# Patient Record
Sex: MALE | Race: WHITE
[De-identification: names, ages, dates, MRNs, and addresses within clinical notes are randomized per-mention and may not be internally consistent; named-entity substitution may affect disease eponyms.]

---

## 2017-08-18 ENCOUNTER — HOSPITAL ENCOUNTER (EMERGENCY)
Dept: HOSPITAL 53 - M ED | Age: 82
Discharge: HOME | End: 2017-08-18
Payer: MEDICARE

## 2017-08-18 VITALS — SYSTOLIC BLOOD PRESSURE: 153 MMHG | DIASTOLIC BLOOD PRESSURE: 80 MMHG

## 2017-08-18 VITALS — BODY MASS INDEX: 27.15 KG/M2 | HEIGHT: 72 IN | WEIGHT: 200.42 LBS
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
## 2017-08-18 DIAGNOSIS — S01.111A: Primary | ICD-10-CM

## 2017-08-18 DIAGNOSIS — S50.811A: ICD-10-CM

## 2017-08-18 DIAGNOSIS — S05.12XA: ICD-10-CM
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
## 2021-03-27 ENCOUNTER — HOSPITAL ENCOUNTER (OUTPATIENT)
Dept: HOSPITAL 53 - M LABSMTC | Age: 86
End: 2021-03-27
Attending: ANESTHESIOLOGY
Payer: MEDICARE

## 2021-03-27 DIAGNOSIS — Z01.812: Primary | ICD-10-CM

## 2021-04-01 ENCOUNTER — HOSPITAL ENCOUNTER (OUTPATIENT)
Dept: HOSPITAL 53 - M SDC | Age: 86
Discharge: HOME | End: 2021-04-01
Attending: OPHTHALMOLOGY
Payer: MEDICARE

## 2021-04-01 VITALS — BODY MASS INDEX: 28.98 KG/M2 | WEIGHT: 207 LBS | HEIGHT: 71 IN

## 2021-04-01 VITALS — SYSTOLIC BLOOD PRESSURE: 144 MMHG | DIASTOLIC BLOOD PRESSURE: 72 MMHG

## 2021-04-01 DIAGNOSIS — Z85.46: ICD-10-CM

## 2021-04-01 DIAGNOSIS — Z79.899: ICD-10-CM

## 2021-04-01 DIAGNOSIS — H25.12: Primary | ICD-10-CM

## 2021-04-01 PROCEDURE — 66984 XCAPSL CTRC RMVL W/O ECP: CPT

## 2021-04-02 NOTE — RO
OPERATIVE NOTE



DATE OF OPERATION: 04/01/2021



PREOPERATIVE DIAGNOSIS: 

1. Visually significant nuclear sclerotic cataract, left eye.



POSTOPERATIVE DIAGNOSIS:

1. Visually significant nuclear sclerotic cataract, left eye.



PROCEDURE:

1. Cataract extraction with use of phacoemulsification, and placement of

intraocular lens, AU00T0, 23.0 D, left eye.



SURGEON:  Crow Stern DO



ASSISTANT:



ANESTHESIA:  Local (Omidria) with MAC. 

COMPLICATIONS:  None

POSTOPERATIVE CONDITION:  Stable

INDICATIONS FOR SURGERY:  

1.  Blurred vision affecting patient's activities of daily living.



DESCRIPTION OF PROCEDURE:

The patient was seen in the preoperative area and properly identified. The

correct operative eye was identified and marked. The patient received topical

anesthetic, antibiotics, and topical dilating drops. The patient was then

transferred to the operating room.



The correct side was re-identified and a time out was performed. The eye was

prepped and draped in a sterile fashion. The eyelids were isolated with

Tegaderm tape and the lids were held open with an adjustable speculum.

A 1.0 mm paracentesis incision was made. Omidria was then injected into the

anterior chamber. Viscoelastic was then injected into the anterior chamber

through the paracentesis. Using a 2.4 mm sharp-tipped keratome, the anterior

chamber was entered via a temporal clear cornea incision.

A continuous curvilinear capsulorrhexis was created with Utrata forceps.

Hydrodissection was performed with BSS on a blunt cannula until the nucleus was

able to rotate freely. The crystalline lens was phacoemulsified and aspirated.

Irrigation/aspiration was used to remove the cortical material

Cohesive viscoelastic was placed into the capsular bag to deepen it. The

implant was placed into the capsular bag and allowed to unfold. Placement was

confirmed by visualizing the anterior capsulorrhexis. Irrigation/aspiration was

used to remove the viscoelastic.

The clear corneal incision was hydrated with BSS on a blunt cannula. The lens

was well positioned. Intracameral antibiotic was injected into the anterior

chamber. The incisions were then tested for leaks and found to be negative. The

eye was then palpated for appropriate pressure and adjusted accordingly with

BSS.

The eyelid speculum was then carefully removed. A shield was placed over the

eye.

The patient tolerated the procedure well and was discharge to the recovery unit

in a stable condition.

LORY

## 2021-04-03 ENCOUNTER — HOSPITAL ENCOUNTER (OUTPATIENT)
Dept: HOSPITAL 53 - M LABSMTC | Age: 86
End: 2021-04-03
Attending: ANESTHESIOLOGY
Payer: MEDICARE

## 2021-04-03 DIAGNOSIS — Z20.828: Primary | ICD-10-CM

## 2021-04-03 DIAGNOSIS — Z11.59: ICD-10-CM

## 2021-04-08 ENCOUNTER — HOSPITAL ENCOUNTER (OUTPATIENT)
Dept: HOSPITAL 53 - M SDC | Age: 86
Discharge: HOME | End: 2021-04-08
Attending: OPHTHALMOLOGY
Payer: MEDICARE

## 2021-04-08 VITALS — BODY MASS INDEX: 27.09 KG/M2 | WEIGHT: 200 LBS | HEIGHT: 72 IN

## 2021-04-08 VITALS — SYSTOLIC BLOOD PRESSURE: 165 MMHG | DIASTOLIC BLOOD PRESSURE: 75 MMHG

## 2021-04-08 DIAGNOSIS — Z85.46: ICD-10-CM

## 2021-04-08 DIAGNOSIS — N40.0: ICD-10-CM

## 2021-04-08 DIAGNOSIS — Z79.899: ICD-10-CM

## 2021-04-08 DIAGNOSIS — H25.11: Primary | ICD-10-CM

## 2021-04-08 PROCEDURE — 66984 XCAPSL CTRC RMVL W/O ECP: CPT

## 2021-04-09 NOTE — RO
OPERATIVE NOTE



DATE OF OPERATION: 04/08/2021



PREOPERATIVE DIAGNOSIS: 

1. Visually significant nuclear sclerotic cataract, right eye.



POSTOPERATIVE DIAGNOSIS:

1. Visually significant nuclear sclerotic cataract, right eye.



PROCEDURE:

1. Cataract extraction with use of phacoemulsification, and placement of

intraocular lens, AU00T0, 24.5 D, right eye.



SURGEON:  Crow Stern DO



ASSISTANT:



ANESTHESIA:  Local (Omidria) with MAC. 

COMPLICATIONS:  None

POSTOPERATIVE CONDITION:  Stable

INDICATIONS FOR SURGERY:  

1.  Blurred vision affecting patient's activities of daily living.



DESCRIPTION OF PROCEDURE:

The patient was seen in the preoperative area and properly identified. The

correct operative eye was identified and marked. The patient received topical

anesthetic, antibiotics, and topical dilating drops. The patient was then

transferred to the operating room.



The correct side was re-identified and a time out was performed. The eye was

prepped and draped in a sterile fashion. The eyelids were isolated with

Tegaderm tape and the lids were held open with an adjustable speculum.

A 1.0 mm paracentesis incision was made. Omidria was then injected into the

anterior chamber. Viscoelastic was then injected into the anterior chamber

through the paracentesis. Using a 2.4 mm sharp-tipped keratome, the anterior

chamber was entered via a temporal clear cornea incision.

A continuous curvilinear capsulorrhexis was created with Utrata forceps.

Hydrodissection was performed with BSS on a blunt cannula until the nucleus was

able to rotate freely. The crystalline lens was phacoemulsified and aspirated.

Irrigation/aspiration was used to remove the cortical material

Cohesive viscoelastic was placed into the capsular bag to deepen it. The

implant was placed into the capsular bag and allowed to unfold. Placement was

confirmed by visualizing the anterior capsulorrhexis. Irrigation/aspiration was

used to remove the viscoelastic.

The clear corneal incision was hydrated with BSS on a blunt cannula. The lens

was well positioned. Intracameral antibiotic was injected into the anterior

chamber. The incisions were then tested for leaks and found to be negative. The

eye was then palpated for appropriate pressure and adjusted accordingly with

BSS.

The eyelid speculum was then carefully removed. A shield was placed over the

eye.

The patient tolerated the procedure well and was discharge to the recovery unit

in a stable condition.

## 2022-04-29 ENCOUNTER — HOSPITAL ENCOUNTER (OUTPATIENT)
Dept: HOSPITAL 53 - M ADAMS | Age: 87
End: 2022-04-29
Attending: FAMILY MEDICINE
Payer: MEDICARE

## 2022-04-29 DIAGNOSIS — E83.119: ICD-10-CM

## 2022-04-29 DIAGNOSIS — E78.6: Primary | ICD-10-CM

## 2022-04-29 DIAGNOSIS — Z79.899: ICD-10-CM

## 2022-04-29 DIAGNOSIS — R01.1: ICD-10-CM

## 2022-04-29 LAB
25(OH)D3 SERPL-MCNC: 27.8 NG/ML (ref 30–100)
ALBUMIN SERPL BCG-MCNC: 3.5 GM/DL (ref 3.2–5.2)
ALT SERPL W P-5'-P-CCNC: 21 U/L (ref 12–78)
BASOPHILS # BLD AUTO: 0 10^3/UL (ref 0–0.2)
BASOPHILS NFR BLD AUTO: 0.8 % (ref 0–1)
BILIRUB SERPL-MCNC: 1.2 MG/DL (ref 0.2–1)
BUN SERPL-MCNC: 11 MG/DL (ref 7–18)
CALCIUM SERPL-MCNC: 8.5 MG/DL (ref 8.8–10.2)
CHLORIDE SERPL-SCNC: 108 MEQ/L (ref 98–107)
CHOLEST SERPL-MCNC: 85 MG/DL (ref ?–200)
CHOLEST/HDLC SERPL: 1.73 {RATIO} (ref ?–5)
CO2 SERPL-SCNC: 29 MEQ/L (ref 21–32)
CREAT SERPL-MCNC: 0.85 MG/DL (ref 0.7–1.3)
EOSINOPHIL # BLD AUTO: 0.3 10^3/UL (ref 0–0.5)
EOSINOPHIL NFR BLD AUTO: 6.3 % (ref 0–3)
FERRITIN SERPL-MCNC: 37 NG/ML (ref 26–388)
FOLATE SERPL-MCNC: 10.5 NG/ML
GFR SERPL CREATININE-BSD FRML MDRD: > 60 ML/MIN/{1.73_M2} (ref 35–?)
GLUCOSE SERPL-MCNC: 90 MG/DL (ref 70–100)
HCT VFR BLD AUTO: 31.3 % (ref 42–52)
HDLC SERPL-MCNC: 49 MG/DL (ref 40–?)
HGB BLD-MCNC: 10 G/DL (ref 13.5–17.5)
LDLC SERPL CALC-MCNC: 25 MG/DL (ref ?–100)
LYMPHOCYTES # BLD AUTO: 1.6 10^3/UL (ref 1.5–5)
LYMPHOCYTES NFR BLD AUTO: 31 % (ref 24–44)
MCH RBC QN AUTO: 31.3 PG (ref 27–33)
MCHC RBC AUTO-ENTMCNC: 31.9 G/DL (ref 32–36.5)
MCV RBC AUTO: 97.8 FL (ref 80–96)
MONOCYTES # BLD AUTO: 0.6 10^3/UL (ref 0–0.8)
MONOCYTES NFR BLD AUTO: 12.2 % (ref 2–8)
NEUTROPHILS # BLD AUTO: 2.5 10^3/UL (ref 1.5–8.5)
NEUTROPHILS NFR BLD AUTO: 49.5 % (ref 36–66)
NONHDLC SERPL-MCNC: 36 MG/DL
PLATELET # BLD AUTO: 185 10^3/UL (ref 150–450)
POTASSIUM SERPL-SCNC: 4.3 MEQ/L (ref 3.5–5.1)
PROT SERPL-MCNC: 6.7 GM/DL (ref 6.4–8.2)
RBC # BLD AUTO: 3.2 10^6/UL (ref 4.3–6.1)
SODIUM SERPL-SCNC: 141 MEQ/L (ref 136–145)
TRIGL SERPL-MCNC: 55 MG/DL (ref ?–150)
VIT B12 SERPL-MCNC: 721 PG/ML
WBC # BLD AUTO: 5.1 10^3/UL (ref 4–10)

## 2023-04-25 ENCOUNTER — HOSPITAL ENCOUNTER (OUTPATIENT)
Dept: HOSPITAL 53 - M LABDRWAD | Age: 88
End: 2023-04-25
Attending: FAMILY MEDICINE
Payer: MEDICARE

## 2023-04-25 DIAGNOSIS — Z79.899: ICD-10-CM

## 2023-04-25 DIAGNOSIS — E83.110: ICD-10-CM

## 2023-04-25 DIAGNOSIS — R73.01: Primary | ICD-10-CM

## 2023-04-25 DIAGNOSIS — E78.2: ICD-10-CM

## 2023-04-25 DIAGNOSIS — D51.9: ICD-10-CM

## 2023-04-25 DIAGNOSIS — E78.6: ICD-10-CM

## 2023-04-25 LAB
25(OH)D3 SERPL-MCNC: 22.5 NG/ML (ref 20–100)
ALBUMIN SERPL BCG-MCNC: 3.4 G/DL (ref 3.2–5.2)
ALP SERPL-CCNC: 71 U/L (ref 46–116)
ALT SERPL W P-5'-P-CCNC: 13 U/L (ref 7–40)
AST SERPL-CCNC: 23 U/L (ref ?–34)
BASOPHILS # BLD AUTO: 0.1 10^3/UL (ref 0–0.2)
BASOPHILS NFR BLD AUTO: 1.1 % (ref 0–1)
BILIRUB SERPL-MCNC: 1.5 MG/DL (ref 0.3–1.2)
BUN SERPL-MCNC: 13 MG/DL (ref 9–23)
CALCIUM SERPL-MCNC: 8.5 MG/DL (ref 8.3–10.6)
CHLORIDE SERPL-SCNC: 108 MMOL/L (ref 98–107)
CHOLEST SERPL-MCNC: 119 MG/DL (ref ?–200)
CHOLEST/HDLC SERPL: 2.67 {RATIO} (ref ?–5)
CO2 SERPL-SCNC: 24 MMOL/L (ref 20–31)
CREAT SERPL-MCNC: 0.82 MG/DL (ref 0.7–1.3)
EOSINOPHIL # BLD AUTO: 0.2 10^3/UL (ref 0–0.5)
EOSINOPHIL NFR BLD AUTO: 4.1 % (ref 0–3)
FERRITIN SERPL-MCNC: 20.9 NG/ML (ref 10.5–307.3)
FOLATE SERPL-MCNC: 9.4 NG/ML (ref 5.4–?)
GFR SERPL CREATININE-BSD FRML MDRD: > 60 ML/MIN/{1.73_M2} (ref 35–?)
GLUCOSE SERPL-MCNC: 89 MG/DL (ref 74–106)
HCT VFR BLD AUTO: 27.2 % (ref 42–52)
HDLC SERPL-MCNC: 44.5 MG/DL (ref 40–?)
HGB BLD-MCNC: 8.5 G/DL (ref 13.5–17.5)
LDLC SERPL CALC-MCNC: 61.7 MG/DL (ref ?–100)
LYMPHOCYTES # BLD AUTO: 1.1 10^3/UL (ref 1.5–5)
LYMPHOCYTES NFR BLD AUTO: 24.1 % (ref 24–44)
MCH RBC QN AUTO: 28.6 PG (ref 27–33)
MCHC RBC AUTO-ENTMCNC: 31.3 G/DL (ref 32–36.5)
MCV RBC AUTO: 91.6 FL (ref 80–96)
MONOCYTES # BLD AUTO: 0.6 10^3/UL (ref 0–0.8)
MONOCYTES NFR BLD AUTO: 14.2 % (ref 2–8)
NEUTROPHILS # BLD AUTO: 2.5 10^3/UL (ref 1.5–8.5)
NEUTROPHILS NFR BLD AUTO: 56.3 % (ref 36–66)
NONHDLC SERPL-MCNC: 74.5 MG/DL
PLATELET # BLD AUTO: 228 10^3/UL (ref 150–450)
POTASSIUM SERPL-SCNC: 4.4 MMOL/L (ref 3.5–5.1)
PROT SERPL-MCNC: 6.5 G/DL (ref 5.7–8.2)
RBC # BLD AUTO: 2.97 10^6/UL (ref 4.3–6.1)
SODIUM SERPL-SCNC: 139 MMOL/L (ref 136–145)
TRIGL SERPL-MCNC: 64 MG/DL (ref ?–150)
VIT B12 SERPL-MCNC: 634 PG/ML (ref 211–911)
WBC # BLD AUTO: 4.4 10^3/UL (ref 4–10)

## 2023-06-14 ENCOUNTER — HOSPITAL ENCOUNTER (EMERGENCY)
Dept: HOSPITAL 53 - M ED | Age: 88
Discharge: HOME | End: 2023-06-14
Payer: MEDICARE

## 2023-06-14 VITALS — TEMPERATURE: 98.8 F | BODY MASS INDEX: 26.04 KG/M2 | HEIGHT: 72 IN | WEIGHT: 192.24 LBS

## 2023-06-14 VITALS — OXYGEN SATURATION: 97 % | DIASTOLIC BLOOD PRESSURE: 65 MMHG | SYSTOLIC BLOOD PRESSURE: 154 MMHG

## 2023-06-14 VITALS — OXYGEN SATURATION: 96 %

## 2023-06-14 DIAGNOSIS — D51.9: ICD-10-CM

## 2023-06-14 DIAGNOSIS — Z85.820: ICD-10-CM

## 2023-06-14 DIAGNOSIS — Z79.82: ICD-10-CM

## 2023-06-14 DIAGNOSIS — Z85.46: ICD-10-CM

## 2023-06-14 DIAGNOSIS — Z79.899: ICD-10-CM

## 2023-06-14 DIAGNOSIS — E78.5: ICD-10-CM

## 2023-06-14 DIAGNOSIS — E83.110: ICD-10-CM

## 2023-06-14 DIAGNOSIS — D50.9: Primary | ICD-10-CM

## 2023-06-14 LAB
ALBUMIN SERPL BCG-MCNC: 3.4 G/DL (ref 3.2–5.2)
ALP SERPL-CCNC: 85 U/L (ref 46–116)
ALT SERPL W P-5'-P-CCNC: 19 U/L (ref 7–40)
AST SERPL-CCNC: 22 U/L (ref ?–34)
BASE EXCESS BLDV CALC-SCNC: -1.6 MMOL/L (ref -2–2)
BASOPHILS # BLD AUTO: 0.1 10^3/UL (ref 0–0.2)
BASOPHILS NFR BLD AUTO: 0.9 % (ref 0–1)
BILIRUB CONJ SERPL-MCNC: 0.5 MG/DL (ref ?–0.4)
BILIRUB SERPL-MCNC: 1.3 MG/DL (ref 0.3–1.2)
BUN SERPL-MCNC: 10 MG/DL (ref 9–23)
CALCIUM SERPL-MCNC: 8.5 MG/DL (ref 8.3–10.6)
CHLORIDE SERPL-SCNC: 104 MMOL/L (ref 98–107)
CO2 BLDV CALC-SCNC: 22.7 MMOL/L (ref 24–28)
CO2 SERPL-SCNC: 24 MMOL/L (ref 20–31)
CREAT SERPL-MCNC: 0.99 MG/DL (ref 0.7–1.3)
EOSINOPHIL # BLD AUTO: 0.1 10^3/UL (ref 0–0.5)
EOSINOPHIL NFR BLD AUTO: 2 % (ref 0–3)
FERRITIN SERPL-MCNC: 16.7 NG/ML (ref 10.5–307.3)
FOLATE SERPL-MCNC: 9.4 NG/ML (ref 5.4–?)
GFR SERPL CREATININE-BSD FRML MDRD: > 60 ML/MIN/{1.73_M2} (ref 35–?)
GLUCOSE SERPL-MCNC: 107 MG/DL (ref 74–106)
HCO3 BLDV-SCNC: 21.8 MMOL/L (ref 23–27)
HCO3 STD BLDV-SCNC: 23.1 MMOL/L
HCT VFR BLD AUTO: 26.8 % (ref 42–52)
HGB BLD-MCNC: 8.2 G/DL (ref 13.5–17.5)
IRON SATN MFR SERPL: 11.2 % (ref 19.7–50)
IRON SERPL-MCNC: 36 UG/DL (ref 65–175)
LYMPHOCYTES # BLD AUTO: 0.9 10^3/UL (ref 1.5–5)
LYMPHOCYTES NFR BLD AUTO: 16.2 % (ref 24–44)
MAGNESIUM SERPL-MCNC: 1.8 MG/DL (ref 1.8–2.4)
MCH RBC QN AUTO: 27.4 PG (ref 27–33)
MCHC RBC AUTO-ENTMCNC: 30.6 G/DL (ref 32–36.5)
MCV RBC AUTO: 89.6 FL (ref 80–96)
MONOCYTES # BLD AUTO: 0.9 10^3/UL (ref 0–0.8)
MONOCYTES NFR BLD AUTO: 15.6 % (ref 2–8)
NEUTROPHILS # BLD AUTO: 3.6 10^3/UL (ref 1.5–8.5)
NEUTROPHILS NFR BLD AUTO: 65.1 % (ref 36–66)
PCO2 BLDV: 31.4 MMHG (ref 38–50)
PH BLDV: 7.46 UNITS (ref 7.33–7.43)
PLATELET # BLD AUTO: 203 10^3/UL (ref 150–450)
PO2 BLDV: 100.2 MMHG (ref 30–50)
POTASSIUM SERPL-SCNC: 3.2 MMOL/L (ref 3.5–5.1)
PROT SERPL-MCNC: 6.4 G/DL (ref 5.7–8.2)
RBC # BLD AUTO: 2.99 10^6/UL (ref 4.3–6.1)
SAO2 % BLDV: 97.2 % (ref 60–80)
SODIUM SERPL-SCNC: 137 MMOL/L (ref 136–145)
TIBC SERPL-MCNC: 321 UG/DL (ref 250–425)
TSH SERPL DL<=0.005 MIU/L-ACNC: 2.25 UIU/ML (ref 0.55–4.78)
VIT B12 SERPL-MCNC: 814 PG/ML (ref 211–911)
WBC # BLD AUTO: 5.5 10^3/UL (ref 4–10)

## 2023-06-14 PROCEDURE — 83550 IRON BINDING TEST: CPT

## 2023-06-14 PROCEDURE — 94760 N-INVAS EAR/PLS OXIMETRY 1: CPT

## 2023-06-14 PROCEDURE — 82746 ASSAY OF FOLIC ACID SERUM: CPT

## 2023-06-14 PROCEDURE — 99285 EMERGENCY DEPT VISIT HI MDM: CPT

## 2023-06-14 PROCEDURE — 82607 VITAMIN B-12: CPT

## 2023-06-14 PROCEDURE — 36415 COLL VENOUS BLD VENIPUNCTURE: CPT

## 2023-06-14 PROCEDURE — 93041 RHYTHM ECG TRACING: CPT

## 2023-06-14 PROCEDURE — 80076 HEPATIC FUNCTION PANEL: CPT

## 2023-06-14 PROCEDURE — 85025 COMPLETE CBC W/AUTO DIFF WBC: CPT

## 2023-06-14 PROCEDURE — 71275 CT ANGIOGRAPHY CHEST: CPT

## 2023-06-14 PROCEDURE — 93005 ELECTROCARDIOGRAM TRACING: CPT

## 2023-06-14 PROCEDURE — 87040 BLOOD CULTURE FOR BACTERIA: CPT

## 2023-06-14 PROCEDURE — 84443 ASSAY THYROID STIM HORMONE: CPT

## 2023-06-14 PROCEDURE — 87486 CHLMYD PNEUM DNA AMP PROBE: CPT

## 2023-06-14 PROCEDURE — 82728 ASSAY OF FERRITIN: CPT

## 2023-06-14 PROCEDURE — 87633 RESP VIRUS 12-25 TARGETS: CPT

## 2023-06-14 PROCEDURE — 83880 ASSAY OF NATRIURETIC PEPTIDE: CPT

## 2023-06-14 PROCEDURE — 87581 M.PNEUMON DNA AMP PROBE: CPT

## 2023-06-14 PROCEDURE — 71045 X-RAY EXAM CHEST 1 VIEW: CPT

## 2023-06-14 PROCEDURE — 83735 ASSAY OF MAGNESIUM: CPT

## 2023-06-14 PROCEDURE — 80048 BASIC METABOLIC PNL TOTAL CA: CPT

## 2023-06-14 PROCEDURE — 82803 BLOOD GASES ANY COMBINATION: CPT

## 2023-06-14 PROCEDURE — 87798 DETECT AGENT NOS DNA AMP: CPT

## 2023-08-08 ENCOUNTER — HOSPITAL ENCOUNTER (OUTPATIENT)
Dept: HOSPITAL 53 - M PLALAB | Age: 88
End: 2023-08-08
Payer: MEDICARE

## 2023-08-08 DIAGNOSIS — D50.9: Primary | ICD-10-CM

## 2023-08-08 DIAGNOSIS — Z79.899: ICD-10-CM

## 2023-08-08 DIAGNOSIS — K74.60: ICD-10-CM

## 2023-08-08 LAB
ALBUMIN SERPL BCG-MCNC: 3.3 G/DL (ref 3.2–5.2)
ALP SERPL-CCNC: 117 U/L (ref 46–116)
ALT SERPL W P-5'-P-CCNC: 16 U/L (ref 7–40)
AST SERPL-CCNC: 21 U/L (ref ?–34)
BILIRUB CONJ SERPL-MCNC: 0.4 MG/DL (ref ?–0.4)
BILIRUB SERPL-MCNC: 0.9 MG/DL (ref 0.3–1.2)
FERRITIN SERPL-MCNC: 20.1 NG/ML (ref 10.5–307.3)
FOLATE SERPL-MCNC: 9.34 NG/ML (ref 5.4–?)
HBV CORE IGM SER QL: POSITIVE
HBV SURFACE AB SER QL: NEGATIVE
HCT VFR BLD AUTO: 25.4 % (ref 42–52)
HCV AB SER QL: 0.16 INDEX (ref ?–0.8)
HGB BLD-MCNC: 7.7 G/DL (ref 13.5–17.5)
IRON SATN MFR SERPL: 8.1 % (ref 19.7–50)
IRON SERPL-MCNC: 26 UG/DL (ref 65–175)
MCH RBC QN AUTO: 28.3 PG (ref 27–33)
MCHC RBC AUTO-ENTMCNC: 30.3 G/DL (ref 32–36.5)
MCV RBC AUTO: 93.4 FL (ref 80–96)
PLATELET # BLD AUTO: 237 10^3/UL (ref 150–450)
PROT SERPL-MCNC: 6.1 G/DL (ref 5.7–8.2)
RBC # BLD AUTO: 2.72 10^6/UL (ref 4.3–6.1)
T4 FREE SERPL-MCNC: 0.69 NG/DL (ref 0.89–1.76)
TIBC SERPL-MCNC: 320 UG/DL (ref 250–425)
TSH SERPL DL<=0.005 MIU/L-ACNC: 2.74 UIU/ML (ref 0.55–4.78)
VIT B12 SERPL-MCNC: 638 PG/ML (ref 211–911)
WBC # BLD AUTO: 5.5 10^3/UL (ref 4–10)

## 2023-08-10 LAB — TRANSFERRIN SERPL-MCNC: 244 MG/DL (ref 149–313)

## 2023-08-21 ENCOUNTER — HOSPITAL ENCOUNTER (OUTPATIENT)
Dept: HOSPITAL 53 - M INFU | Age: 88
Discharge: HOME | End: 2023-08-21
Payer: MEDICARE

## 2023-08-21 VITALS — OXYGEN SATURATION: 97 % | SYSTOLIC BLOOD PRESSURE: 131 MMHG | DIASTOLIC BLOOD PRESSURE: 60 MMHG

## 2023-08-21 VITALS — DIASTOLIC BLOOD PRESSURE: 77 MMHG | OXYGEN SATURATION: 98 % | SYSTOLIC BLOOD PRESSURE: 134 MMHG

## 2023-08-21 VITALS — WEIGHT: 195.33 LBS | BODY MASS INDEX: 27.35 KG/M2 | HEIGHT: 71 IN

## 2023-08-21 DIAGNOSIS — D50.9: Primary | ICD-10-CM

## 2023-08-21 PROCEDURE — 96365 THER/PROPH/DIAG IV INF INIT: CPT

## 2023-08-28 ENCOUNTER — HOSPITAL ENCOUNTER (OUTPATIENT)
Dept: HOSPITAL 53 - M INFU | Age: 88
End: 2023-08-28
Payer: MEDICARE

## 2023-08-28 VITALS — DIASTOLIC BLOOD PRESSURE: 64 MMHG | OXYGEN SATURATION: 95 % | SYSTOLIC BLOOD PRESSURE: 119 MMHG

## 2023-08-28 VITALS — SYSTOLIC BLOOD PRESSURE: 125 MMHG | OXYGEN SATURATION: 99 % | DIASTOLIC BLOOD PRESSURE: 60 MMHG

## 2023-08-28 VITALS — BODY MASS INDEX: 27.33 KG/M2 | HEIGHT: 70 IN | WEIGHT: 190.92 LBS

## 2023-08-28 DIAGNOSIS — D50.9: Primary | ICD-10-CM

## 2023-08-28 PROCEDURE — 96365 THER/PROPH/DIAG IV INF INIT: CPT

## 2023-09-05 ENCOUNTER — HOSPITAL ENCOUNTER (OUTPATIENT)
Dept: HOSPITAL 53 - M INFU | Age: 88
Discharge: HOME | End: 2023-09-05
Payer: MEDICARE

## 2023-09-05 VITALS
OXYGEN SATURATION: 95 % | BODY MASS INDEX: 27.33 KG/M2 | WEIGHT: 190.92 LBS | HEIGHT: 70 IN | SYSTOLIC BLOOD PRESSURE: 113 MMHG | DIASTOLIC BLOOD PRESSURE: 60 MMHG

## 2023-09-05 VITALS — DIASTOLIC BLOOD PRESSURE: 58 MMHG | OXYGEN SATURATION: 96 % | SYSTOLIC BLOOD PRESSURE: 104 MMHG

## 2023-09-05 DIAGNOSIS — D50.9: Primary | ICD-10-CM

## 2023-09-05 PROCEDURE — 96365 THER/PROPH/DIAG IV INF INIT: CPT

## 2023-09-11 ENCOUNTER — HOSPITAL ENCOUNTER (OUTPATIENT)
Dept: HOSPITAL 53 - M INFU | Age: 88
End: 2023-09-11
Payer: MEDICARE

## 2023-09-11 VITALS — WEIGHT: 191.8 LBS | BODY MASS INDEX: 25.98 KG/M2 | HEIGHT: 72 IN

## 2023-09-11 VITALS — SYSTOLIC BLOOD PRESSURE: 129 MMHG | DIASTOLIC BLOOD PRESSURE: 60 MMHG | OXYGEN SATURATION: 98 %

## 2023-09-11 VITALS — SYSTOLIC BLOOD PRESSURE: 136 MMHG | DIASTOLIC BLOOD PRESSURE: 67 MMHG | OXYGEN SATURATION: 97 %

## 2023-09-11 DIAGNOSIS — D50.9: Primary | ICD-10-CM

## 2023-09-11 PROCEDURE — 96365 THER/PROPH/DIAG IV INF INIT: CPT

## 2023-12-11 ENCOUNTER — HOSPITAL ENCOUNTER (OUTPATIENT)
Dept: HOSPITAL 53 - M OPP | Age: 88
Discharge: HOME | End: 2023-12-11
Attending: INTERNAL MEDICINE
Payer: MEDICARE

## 2023-12-11 VITALS — OXYGEN SATURATION: 94 % | SYSTOLIC BLOOD PRESSURE: 131 MMHG | DIASTOLIC BLOOD PRESSURE: 70 MMHG

## 2023-12-11 VITALS — WEIGHT: 184.8 LBS | HEIGHT: 72 IN | BODY MASS INDEX: 25.03 KG/M2

## 2023-12-11 DIAGNOSIS — D50.9: ICD-10-CM

## 2023-12-11 DIAGNOSIS — K29.70: ICD-10-CM

## 2023-12-11 DIAGNOSIS — K57.30: ICD-10-CM

## 2023-12-11 DIAGNOSIS — K64.4: ICD-10-CM

## 2023-12-11 DIAGNOSIS — K64.8: ICD-10-CM

## 2023-12-11 DIAGNOSIS — Z79.83: ICD-10-CM

## 2023-12-11 DIAGNOSIS — C18.0: Primary | ICD-10-CM

## 2023-12-11 DIAGNOSIS — Z79.82: ICD-10-CM

## 2023-12-11 DIAGNOSIS — Z95.5: ICD-10-CM

## 2023-12-11 DIAGNOSIS — K22.89: ICD-10-CM

## 2024-01-09 ENCOUNTER — HOSPITAL ENCOUNTER (OUTPATIENT)
Dept: HOSPITAL 53 - M PLAIMG | Age: 89
End: 2024-01-09
Payer: MEDICARE

## 2024-01-09 DIAGNOSIS — M79.89: ICD-10-CM

## 2024-01-09 DIAGNOSIS — M19.042: ICD-10-CM

## 2024-01-09 DIAGNOSIS — Z79.899: ICD-10-CM

## 2024-01-09 DIAGNOSIS — D50.9: Primary | ICD-10-CM

## 2024-01-09 LAB
BUN SERPL-MCNC: 15 MG/DL (ref 9–23)
CALCIUM SERPL-MCNC: 8.5 MG/DL (ref 8.3–10.6)
CHLORIDE SERPL-SCNC: 109 MMOL/L (ref 98–107)
CO2 SERPL-SCNC: 25 MMOL/L (ref 20–31)
CREAT SERPL-MCNC: 0.79 MG/DL (ref 0.7–1.3)
GFR SERPL CREATININE-BSD FRML MDRD: > 60 ML/MIN/{1.73_M2} (ref 35–?)
GLUCOSE SERPL-MCNC: 77 MG/DL (ref 74–106)
POTASSIUM SERPL-SCNC: 4.5 MMOL/L (ref 3.5–5.1)
SODIUM SERPL-SCNC: 141 MMOL/L (ref 136–145)
T4 FREE SERPL-MCNC: 0.76 NG/DL (ref 0.89–1.76)
TSH SERPL DL<=0.005 MIU/L-ACNC: 3.33 UIU/ML (ref 0.55–4.78)

## 2024-01-12 ENCOUNTER — HOSPITAL ENCOUNTER (OUTPATIENT)
Dept: HOSPITAL 53 - M INFU | Age: 89
End: 2024-01-12
Payer: MEDICARE

## 2024-01-12 VITALS — OXYGEN SATURATION: 96 % | SYSTOLIC BLOOD PRESSURE: 163 MMHG | DIASTOLIC BLOOD PRESSURE: 72 MMHG

## 2024-01-12 VITALS — SYSTOLIC BLOOD PRESSURE: 137 MMHG | OXYGEN SATURATION: 100 % | DIASTOLIC BLOOD PRESSURE: 62 MMHG

## 2024-01-12 VITALS — BODY MASS INDEX: 26.58 KG/M2 | WEIGHT: 196.21 LBS | HEIGHT: 72 IN

## 2024-01-12 DIAGNOSIS — D50.9: Primary | ICD-10-CM

## 2024-01-12 PROCEDURE — 96365 THER/PROPH/DIAG IV INF INIT: CPT

## 2024-01-19 ENCOUNTER — HOSPITAL ENCOUNTER (OUTPATIENT)
Dept: HOSPITAL 53 - M INFU | Age: 89
Discharge: HOME | End: 2024-01-19
Payer: MEDICARE

## 2024-01-19 VITALS — DIASTOLIC BLOOD PRESSURE: 58 MMHG | SYSTOLIC BLOOD PRESSURE: 123 MMHG | OXYGEN SATURATION: 99 %

## 2024-01-19 VITALS — HEIGHT: 78 IN | BODY MASS INDEX: 22.11 KG/M2 | WEIGHT: 191.12 LBS

## 2024-01-19 VITALS — DIASTOLIC BLOOD PRESSURE: 64 MMHG | OXYGEN SATURATION: 97 % | SYSTOLIC BLOOD PRESSURE: 141 MMHG

## 2024-01-19 DIAGNOSIS — D50.9: Primary | ICD-10-CM

## 2024-01-19 PROCEDURE — 96365 THER/PROPH/DIAG IV INF INIT: CPT

## 2024-01-26 ENCOUNTER — HOSPITAL ENCOUNTER (OUTPATIENT)
Dept: HOSPITAL 53 - M INFU | Age: 89
Discharge: HOME | End: 2024-01-26
Payer: MEDICARE

## 2024-01-26 VITALS
BODY MASS INDEX: 25.68 KG/M2 | HEIGHT: 72 IN | SYSTOLIC BLOOD PRESSURE: 109 MMHG | WEIGHT: 189.6 LBS | DIASTOLIC BLOOD PRESSURE: 67 MMHG | OXYGEN SATURATION: 90 %

## 2024-01-26 VITALS — DIASTOLIC BLOOD PRESSURE: 65 MMHG | SYSTOLIC BLOOD PRESSURE: 134 MMHG | OXYGEN SATURATION: 98 %

## 2024-01-26 DIAGNOSIS — D50.9: Primary | ICD-10-CM

## 2024-01-26 PROCEDURE — 96365 THER/PROPH/DIAG IV INF INIT: CPT

## 2024-01-26 RX ADMIN — SODIUM CHLORIDE ONE MLS/HR: 9 INJECTION, SOLUTION INTRAVENOUS at 11:25

## 2024-01-29 ENCOUNTER — HOSPITAL ENCOUNTER (OUTPATIENT)
Dept: HOSPITAL 53 - M RAD | Age: 89
End: 2024-01-29
Attending: INTERNAL MEDICINE
Payer: MEDICARE

## 2024-01-29 DIAGNOSIS — C18.9: Primary | ICD-10-CM

## 2024-01-29 PROCEDURE — 71260 CT THORAX DX C+: CPT

## 2024-01-29 PROCEDURE — 74177 CT ABD & PELVIS W/CONTRAST: CPT

## 2024-02-02 ENCOUNTER — HOSPITAL ENCOUNTER (OUTPATIENT)
Dept: HOSPITAL 53 - M INFU | Age: 89
End: 2024-02-02
Payer: MEDICARE

## 2024-02-02 VITALS
HEIGHT: 72 IN | SYSTOLIC BLOOD PRESSURE: 165 MMHG | OXYGEN SATURATION: 96 % | BODY MASS INDEX: 25.77 KG/M2 | DIASTOLIC BLOOD PRESSURE: 70 MMHG | WEIGHT: 190.26 LBS

## 2024-02-02 VITALS — SYSTOLIC BLOOD PRESSURE: 119 MMHG | OXYGEN SATURATION: 96 % | DIASTOLIC BLOOD PRESSURE: 56 MMHG

## 2024-02-02 DIAGNOSIS — D50.9: Primary | ICD-10-CM

## 2024-02-02 PROCEDURE — 96365 THER/PROPH/DIAG IV INF INIT: CPT

## 2024-02-19 ENCOUNTER — HOSPITAL ENCOUNTER (OUTPATIENT)
Dept: HOSPITAL 53 - M SFHCPLAZ | Age: 89
End: 2024-02-19
Attending: INTERNAL MEDICINE
Payer: MEDICARE

## 2024-02-19 DIAGNOSIS — Z11.59: Primary | ICD-10-CM

## 2024-02-19 DIAGNOSIS — Z20.5: ICD-10-CM

## 2024-02-19 DIAGNOSIS — R76.8: ICD-10-CM

## 2024-02-19 DIAGNOSIS — K74.60: ICD-10-CM

## 2024-04-17 ENCOUNTER — HOSPITAL ENCOUNTER (OUTPATIENT)
Dept: HOSPITAL 53 - M SFHCPLAZ | Age: 89
End: 2024-04-17
Payer: MEDICARE

## 2024-04-17 DIAGNOSIS — D50.9: Primary | ICD-10-CM

## 2024-04-17 LAB
BASOPHILS # BLD AUTO: 0.1 10^3/UL (ref 0–0.2)
BASOPHILS NFR BLD AUTO: 1.4 % (ref 0–1)
EOSINOPHIL # BLD AUTO: 0.3 10^3/UL (ref 0–0.5)
EOSINOPHIL NFR BLD AUTO: 7.6 % (ref 0–3)
FERRITIN SERPL-MCNC: 30.1 NG/ML (ref 10.5–307.3)
FOLATE SERPL-MCNC: 11.96 NG/ML (ref 5.4–?)
HCT VFR BLD AUTO: 28.8 % (ref 42–52)
HGB BLD-MCNC: 9.1 G/DL (ref 13.5–17.5)
IRON SERPL-MCNC: 196 UG/DL (ref 65–175)
LYMPHOCYTES # BLD AUTO: 0.9 10^3/UL (ref 1.5–5)
LYMPHOCYTES NFR BLD AUTO: 23.9 % (ref 24–44)
MCH RBC QN AUTO: 31.9 PG (ref 27–33)
MCHC RBC AUTO-ENTMCNC: 31.6 G/DL (ref 32–36.5)
MCV RBC AUTO: 101.1 FL (ref 80–96)
MONOCYTES # BLD AUTO: 0.5 10^3/UL (ref 0–0.8)
MONOCYTES NFR BLD AUTO: 13.5 % (ref 2–8)
NEUTROPHILS # BLD AUTO: 1.9 10^3/UL (ref 1.5–8.5)
NEUTROPHILS NFR BLD AUTO: 53.3 % (ref 36–66)
PLATELET # BLD AUTO: 159 10^3/UL (ref 150–450)
RBC # BLD AUTO: 2.85 10^6/UL (ref 4.3–6.1)
VIT B12 SERPL-MCNC: 853 PG/ML (ref 211–911)
WBC # BLD AUTO: 3.6 10^3/UL (ref 4–10)

## 2024-04-29 ENCOUNTER — HOSPITAL ENCOUNTER (OUTPATIENT)
Dept: HOSPITAL 53 - M INFU | Age: 89
End: 2024-04-29
Payer: MEDICARE

## 2024-04-29 VITALS — BODY MASS INDEX: 29.71 KG/M2 | HEIGHT: 69 IN | WEIGHT: 200.62 LBS

## 2024-04-29 VITALS — SYSTOLIC BLOOD PRESSURE: 134 MMHG | DIASTOLIC BLOOD PRESSURE: 70 MMHG | OXYGEN SATURATION: 98 %

## 2024-04-29 VITALS — OXYGEN SATURATION: 98 % | DIASTOLIC BLOOD PRESSURE: 83 MMHG | SYSTOLIC BLOOD PRESSURE: 154 MMHG

## 2024-04-29 DIAGNOSIS — D50.9: Primary | ICD-10-CM

## 2024-04-29 PROCEDURE — 96365 THER/PROPH/DIAG IV INF INIT: CPT

## 2024-04-29 RX ADMIN — SODIUM CHLORIDE ONE MLS/HR: 9 INJECTION, SOLUTION INTRAVENOUS at 10:24

## 2024-05-06 ENCOUNTER — HOSPITAL ENCOUNTER (OUTPATIENT)
Dept: HOSPITAL 53 - M INFU | Age: 89
Discharge: HOME | End: 2024-05-06
Payer: MEDICARE

## 2024-05-06 VITALS — OXYGEN SATURATION: 98 % | SYSTOLIC BLOOD PRESSURE: 138 MMHG | DIASTOLIC BLOOD PRESSURE: 67 MMHG

## 2024-05-06 VITALS — SYSTOLIC BLOOD PRESSURE: 123 MMHG | OXYGEN SATURATION: 100 % | DIASTOLIC BLOOD PRESSURE: 66 MMHG

## 2024-05-06 DIAGNOSIS — D50.9: Primary | ICD-10-CM

## 2024-05-06 PROCEDURE — 96365 THER/PROPH/DIAG IV INF INIT: CPT

## 2024-05-06 RX ADMIN — SODIUM CHLORIDE ONE MLS/HR: 9 INJECTION, SOLUTION INTRAVENOUS at 09:45

## 2024-05-13 ENCOUNTER — HOSPITAL ENCOUNTER (OUTPATIENT)
Dept: HOSPITAL 53 - M INFU | Age: 89
End: 2024-05-13
Payer: MEDICARE

## 2024-05-13 VITALS — HEIGHT: 69 IN | BODY MASS INDEX: 29.71 KG/M2 | WEIGHT: 200.62 LBS

## 2024-05-13 VITALS — OXYGEN SATURATION: 100 % | SYSTOLIC BLOOD PRESSURE: 133 MMHG | DIASTOLIC BLOOD PRESSURE: 64 MMHG

## 2024-05-13 VITALS — OXYGEN SATURATION: 99 % | DIASTOLIC BLOOD PRESSURE: 68 MMHG | SYSTOLIC BLOOD PRESSURE: 130 MMHG

## 2024-05-13 DIAGNOSIS — D50.9: Primary | ICD-10-CM

## 2024-05-13 PROCEDURE — 96365 THER/PROPH/DIAG IV INF INIT: CPT

## 2024-05-13 RX ADMIN — SODIUM CHLORIDE ONE MLS/HR: 9 INJECTION, SOLUTION INTRAVENOUS at 10:04
